# Patient Record
Sex: FEMALE | Race: WHITE | NOT HISPANIC OR LATINO | URBAN - METROPOLITAN AREA
[De-identification: names, ages, dates, MRNs, and addresses within clinical notes are randomized per-mention and may not be internally consistent; named-entity substitution may affect disease eponyms.]

---

## 2018-08-13 ENCOUNTER — APPOINTMENT (OUTPATIENT)
Dept: URBAN - METROPOLITAN AREA CLINIC 219 | Age: 43
Setting detail: DERMATOLOGY
End: 2018-08-13

## 2018-08-13 DIAGNOSIS — L81.4 OTHER MELANIN HYPERPIGMENTATION: ICD-10-CM

## 2018-08-13 DIAGNOSIS — L82.0 INFLAMED SEBORRHEIC KERATOSIS: ICD-10-CM

## 2018-08-13 PROBLEM — L29.8 OTHER PRURITUS: Status: ACTIVE | Noted: 2018-08-13

## 2018-08-13 PROBLEM — G40.909 EPILEPSY, UNSPECIFIED, NOT INTRACTABLE, WITHOUT STATUS EPILEPTICUS: Status: ACTIVE | Noted: 2018-08-13

## 2018-08-13 PROCEDURE — OTHER LIQUID NITROGEN: OTHER

## 2018-08-13 PROCEDURE — OTHER REASSURANCE: OTHER

## 2018-08-13 PROCEDURE — 99202 OFFICE O/P NEW SF 15 MIN: CPT | Mod: 25

## 2018-08-13 PROCEDURE — 17110 DESTRUCT B9 LESION 1-14: CPT

## 2018-08-13 ASSESSMENT — LOCATION ZONE DERM
LOCATION ZONE: NOSE
LOCATION ZONE: TRUNK
LOCATION ZONE: NECK
LOCATION ZONE: FACE

## 2018-08-13 ASSESSMENT — LOCATION SIMPLE DESCRIPTION DERM
LOCATION SIMPLE: RIGHT CHEEK
LOCATION SIMPLE: TRAPEZIAL NECK
LOCATION SIMPLE: LEFT CHEEK
LOCATION SIMPLE: RIGHT UPPER BACK
LOCATION SIMPLE: NOSE

## 2018-08-13 ASSESSMENT — LOCATION DETAILED DESCRIPTION DERM
LOCATION DETAILED: MID TRAPEZIAL NECK
LOCATION DETAILED: RIGHT INFERIOR UPPER BACK
LOCATION DETAILED: RIGHT CENTRAL MALAR CHEEK
LOCATION DETAILED: NASAL DORSUM
LOCATION DETAILED: LEFT INFERIOR CENTRAL MALAR CHEEK

## 2018-08-13 NOTE — PROCEDURE: LIQUID NITROGEN
Consent: The patient's consent was obtained including but not limited to risks of crusting, scabbing, blistering, scarring, darker or lighter pigmentary change, recurrence, incomplete removal and infection.
Post-Care Instructions: I reviewed with the patient in detail post-care instructions. Patient is to wear sunprotection, and avoid picking at any of the treated lesions. Pt may apply Vaseline to crusted or scabbing areas.
Medical Necessity Information: It is in your best interest to select a reason for this procedure from the list below. All of these items fulfill various CMS LCD requirements except the new and changing color options.
Include Z78.9 (Other Specified Conditions Influencing Health Status) As An Associated Diagnosis?: No
Detail Level: Detailed
Medical Necessity Clause: This procedure was medically necessary because the lesions that were treated were:

## 2020-02-24 ENCOUNTER — APPOINTMENT (OUTPATIENT)
Dept: URBAN - METROPOLITAN AREA CLINIC 219 | Age: 45
Setting detail: DERMATOLOGY
End: 2020-02-25

## 2020-02-24 DIAGNOSIS — D18.0 HEMANGIOMA: ICD-10-CM

## 2020-02-24 DIAGNOSIS — L60.1 ONYCHOLYSIS: ICD-10-CM

## 2020-02-24 DIAGNOSIS — L67.8 OTHER HAIR COLOR AND HAIR SHAFT ABNORMALITIES: ICD-10-CM

## 2020-02-24 DIAGNOSIS — D22 MELANOCYTIC NEVI: ICD-10-CM

## 2020-02-24 PROBLEM — D18.01 HEMANGIOMA OF SKIN AND SUBCUTANEOUS TISSUE: Status: ACTIVE | Noted: 2020-02-24

## 2020-02-24 PROBLEM — D22.5 MELANOCYTIC NEVI OF TRUNK: Status: ACTIVE | Noted: 2020-02-24

## 2020-02-24 PROCEDURE — OTHER REASSURANCE: OTHER

## 2020-02-24 PROCEDURE — OTHER TREATMENT REGIMEN: OTHER

## 2020-02-24 PROCEDURE — OTHER COUNSELING: OTHER

## 2020-02-24 PROCEDURE — 99213 OFFICE O/P EST LOW 20 MIN: CPT

## 2020-02-24 ASSESSMENT — LOCATION DETAILED DESCRIPTION DERM
LOCATION DETAILED: LEFT MEDIAL FRONTAL SCALP
LOCATION DETAILED: RIGHT INFERIOR MEDIAL MIDBACK
LOCATION DETAILED: LEFT LATERAL ABDOMEN

## 2020-02-24 ASSESSMENT — LOCATION ZONE DERM
LOCATION ZONE: TRUNK
LOCATION ZONE: SCALP

## 2020-02-24 ASSESSMENT — LOCATION SIMPLE DESCRIPTION DERM
LOCATION SIMPLE: LEFT SCALP
LOCATION SIMPLE: RIGHT LOWER BACK
LOCATION SIMPLE: ABDOMEN

## 2020-02-24 NOTE — HPI: OTHER
Condition:: Hair breakage on two separate areas of scalp- present for two years
Please Describe Your Condition:: comes in for a chief complaint of Hair breakage on two separate areas of scalp- present for two years. Mother states the hair in those areas not growing as quickly as in other areas. Denies any new med or med changes around the time the condition presented. Patient referred for evaluation by her PCP. Patient denies cutting or pulling hair
Condition:: Separation of fingernails from nail bed
Please Describe Your Condition:: Present for several months

## 2020-02-24 NOTE — PROCEDURE: TREATMENT REGIMEN
Plan: Recommend shampooing hair daily- alternate Nioxin and Selsun Blue moisturizing hair with aloe\\nBiotin 2.5 to 5 mg once a day- instructed to stop med 3 days prior to any bloodwork as it may alter results
Plan: Recommend Biotin 2.5 to 5mg once a day- patient to discontinue 3 days prior to any lab work as it may alter results
Detail Level: Simple

## 2020-08-27 ENCOUNTER — CLAIMS CREATED FROM THE CLAIM WINDOW (OUTPATIENT)
Dept: URBAN - NONMETROPOLITAN AREA CLINIC 2 | Facility: CLINIC | Age: 45
End: 2020-08-27
Payer: MEDICARE

## 2020-08-27 ENCOUNTER — LAB OUTSIDE AN ENCOUNTER (OUTPATIENT)
Dept: URBAN - NONMETROPOLITAN AREA CLINIC 2 | Facility: CLINIC | Age: 45
End: 2020-08-27

## 2020-08-27 DIAGNOSIS — K21.9 GERD (GASTROESOPHAGEAL REFLUX DISEASE): ICD-10-CM

## 2020-08-27 DIAGNOSIS — A04.8 BACTERIAL INFECTION DUE TO H. PYLORI: ICD-10-CM

## 2020-08-27 DIAGNOSIS — R19.7 DIARRHEA: ICD-10-CM

## 2020-08-27 DIAGNOSIS — R13.19 CERVICAL DYSPHAGIA: ICD-10-CM

## 2020-08-27 DIAGNOSIS — B96.81 H. PYLORI INFECTION: ICD-10-CM

## 2020-08-27 DIAGNOSIS — R13.10 DYSPHAGIA: ICD-10-CM

## 2020-08-27 PROCEDURE — G8420 CALC BMI NORM PARAMETERS: HCPCS | Performed by: NURSE PRACTITIONER

## 2020-08-27 PROCEDURE — 99214 OFFICE O/P EST MOD 30 MIN: CPT | Performed by: NURSE PRACTITIONER

## 2020-08-27 RX ORDER — SODIUM, POTASSIUM,MAG SULFATES 17.5-3.13G
354 ML AS DIRECTED SOLUTION, RECONSTITUTED, ORAL ORAL ONCE
Qty: 354 MILLILITER | Refills: 0 | OUTPATIENT
Start: 2020-08-27 | End: 2020-08-28

## 2020-08-27 RX ORDER — LEVONORGESTREL AND ETHINYL ESTRADIOL 0.1-0.02MG
TAKE 1 TABLET BY ORAL ROUTE ONCE DAILY KIT ORAL 1
Qty: 0 | Refills: 0 | Status: ACTIVE | COMMUNITY
Start: 1900-01-01

## 2020-08-27 NOTE — HPI-TODAY'S VISIT:
Jessica presents for follow-up of reflux, and diarrhea.  Since her last visit she had been doing well.  She has a history of H. pylori gastritis status post Prevpac.  Her reflux is stable on pantoprazole 20 mg daily.  Over the past 3 weeks she has had increased postprandial bloating with diarrhea.  Her mother is concerned that she is contracted H. pylori again.  She states that she will have multiple loose bowel movements throughout the day.  This is typically after eating but particularly after eating fatty meals.  She denies any mucus or blood in the stool.  She denies any nocturnal symptoms.  Her blood work in the past has been normal, however she has noticed increased bruising recently.  She denies any specific trigger meals, but does note eating out like [facility] may make the diarrhea worse.  She does still have her gallbladder.  She has never had a colonoscopy.  She does use the Levsin after her symptoms began with some relief, she is not taking it prophylactically.  Today the diarrhea is her main complaint.  MB

## 2020-08-28 LAB
A/G RATIO: 1.7
ALBUMIN: 3.8
ALKALINE PHOSPHATASE: 45
ALT (SGPT): 6
AST (SGOT): 14
BASO (ABSOLUTE): 0
BASOS: 0
BILIRUBIN, TOTAL: 0.3
BUN/CREATININE RATIO: 16
BUN: 12
C-REACTIVE PROTEIN, QUANT: 4
CALCIUM: 8.6
CARBON DIOXIDE, TOTAL: 22
CHLORIDE: 102
CREATININE: 0.73
EGFR IF AFRICN AM: 117
EGFR IF NONAFRICN AM: 101
EOS (ABSOLUTE): 0
EOS: 0
GLOBULIN, TOTAL: 2.2
GLUCOSE: 79
HEMATOCRIT: 36.6
HEMATOLOGY COMMENTS:: (no result)
HEMOGLOBIN: 12.1
IMMATURE CELLS: (no result)
IMMATURE GRANS (ABS): 0
IMMATURE GRANULOCYTES: 0
LYMPHS (ABSOLUTE): 2.4
LYMPHS: 35
MCH: 28.5
MCHC: 33.1
MCV: 86
MONOCYTES(ABSOLUTE): 0.3
MONOCYTES: 4
NEUTROPHILS (ABSOLUTE): 4.2
NEUTROPHILS: 61
NRBC: (no result)
PLATELETS: 119
POTASSIUM: 3.9
PROTEIN, TOTAL: 6
RBC: 4.25
RDW: 12.7
SEDIMENTATION RATE-WESTERGREN: 18
SODIUM: 140
WBC: 6.8

## 2020-09-01 ENCOUNTER — LAB OUTSIDE AN ENCOUNTER (OUTPATIENT)
Dept: URBAN - NONMETROPOLITAN AREA CLINIC 2 | Facility: CLINIC | Age: 45
End: 2020-09-01

## 2020-09-04 LAB — H. PYLORI STOOL AG, EIA: NEGATIVE

## 2020-09-08 LAB
A/G RATIO: (no result)
ALBUMIN: (no result)
ALKALINE PHOSPHATASE: (no result)
ALT (SGPT): (no result)
AST (SGOT): (no result)
BASO (ABSOLUTE): (no result)
BASOS: (no result)
BILIRUBIN, TOTAL: (no result)
BUN/CREATININE RATIO: (no result)
BUN: (no result)
C-REACTIVE PROTEIN, QUANT: (no result)
CALCIUM: (no result)
CAMPYLOBACTER CULTURE: (no result)
CARBON DIOXIDE, TOTAL: (no result)
CHLORIDE: (no result)
CREATININE: (no result)
E COLI SHIGA TOXIN EIA: NEGATIVE
EGFR IF AFRICN AM: (no result)
EGFR IF NONAFRICN AM: (no result)
EOS (ABSOLUTE): (no result)
EOS: (no result)
GLOBULIN, TOTAL: (no result)
GLUCOSE: (no result)
HEMATOCRIT: (no result)
HEMATOLOGY COMMENTS:: (no result)
HEMOGLOBIN: (no result)
IMMATURE CELLS: (no result)
IMMATURE GRANS (ABS): (no result)
IMMATURE GRANULOCYTES: (no result)
LYMPHS (ABSOLUTE): (no result)
LYMPHS: (no result)
Lab: (no result)
MCH: (no result)
MCHC: (no result)
MCV: (no result)
MONOCYTES(ABSOLUTE): (no result)
MONOCYTES: (no result)
NEUTROPHILS (ABSOLUTE): (no result)
NEUTROPHILS: (no result)
NRBC: (no result)
OVA + PARASITE EXAM: (no result)
PLATELETS: (no result)
POTASSIUM: (no result)
PROTEIN, TOTAL: (no result)
RBC: (no result)
RDW: (no result)
REQUEST PROBLEM: (no result)
SALMONELLA/SHIGELLA SCREEN: (no result)
SEDIMENTATION RATE-WESTERGREN: (no result)
SODIUM: (no result)
WBC: (no result)
WHITE BLOOD CELLS (WBC), STOOL: (no result)

## 2020-09-16 ENCOUNTER — OFFICE VISIT (OUTPATIENT)
Dept: URBAN - NONMETROPOLITAN AREA SURGERY CENTER 1 | Facility: SURGERY CENTER | Age: 45
End: 2020-09-16
Payer: MEDICARE

## 2020-09-16 ENCOUNTER — CLAIMS CREATED FROM THE CLAIM WINDOW (OUTPATIENT)
Dept: URBAN - METROPOLITAN AREA CLINIC 4 | Facility: CLINIC | Age: 45
End: 2020-09-16
Payer: MEDICARE

## 2020-09-16 DIAGNOSIS — R10.84 ABDOMINAL CRAMPING, GENERALIZED: ICD-10-CM

## 2020-09-16 DIAGNOSIS — R19.7 ACUTE DIARRHEA: ICD-10-CM

## 2020-09-16 DIAGNOSIS — K63.89 OTHER SPECIFIED DISEASES OF INTESTINE: ICD-10-CM

## 2020-09-16 PROCEDURE — 88305 TISSUE EXAM BY PATHOLOGIST: CPT | Performed by: PATHOLOGY

## 2020-09-16 PROCEDURE — G8907 PT DOC NO EVENTS ON DISCHARG: HCPCS | Performed by: INTERNAL MEDICINE

## 2020-09-16 PROCEDURE — 88342 IMHCHEM/IMCYTCHM 1ST ANTB: CPT | Performed by: PATHOLOGY

## 2020-09-16 PROCEDURE — 45380 COLONOSCOPY AND BIOPSY: CPT | Performed by: INTERNAL MEDICINE

## 2020-09-16 PROCEDURE — 88313 SPECIAL STAINS GROUP 2: CPT | Performed by: PATHOLOGY

## 2020-09-16 PROCEDURE — G9937 DIG OR SURV COLSCO: HCPCS | Performed by: INTERNAL MEDICINE

## 2020-10-19 ENCOUNTER — WEB ENCOUNTER (OUTPATIENT)
Dept: URBAN - NONMETROPOLITAN AREA CLINIC 2 | Facility: CLINIC | Age: 45
End: 2020-10-19

## 2020-10-19 ENCOUNTER — OFFICE VISIT (OUTPATIENT)
Dept: URBAN - NONMETROPOLITAN AREA CLINIC 2 | Facility: CLINIC | Age: 45
End: 2020-10-19
Payer: MEDICARE

## 2020-10-19 VITALS
BODY MASS INDEX: 21.66 KG/M2 | WEIGHT: 130 LBS | TEMPERATURE: 97 F | HEART RATE: 86 BPM | DIASTOLIC BLOOD PRESSURE: 80 MMHG | HEIGHT: 65 IN | SYSTOLIC BLOOD PRESSURE: 122 MMHG

## 2020-10-19 DIAGNOSIS — B96.81 H. PYLORI INFECTION: ICD-10-CM

## 2020-10-19 DIAGNOSIS — K21.9 GERD (GASTROESOPHAGEAL REFLUX DISEASE): ICD-10-CM

## 2020-10-19 DIAGNOSIS — R19.7 DIARRHEA: ICD-10-CM

## 2020-10-19 PROCEDURE — G8420 CALC BMI NORM PARAMETERS: HCPCS | Performed by: NURSE PRACTITIONER

## 2020-10-19 PROCEDURE — G8427 DOCREV CUR MEDS BY ELIG CLIN: HCPCS | Performed by: NURSE PRACTITIONER

## 2020-10-19 PROCEDURE — 1036F TOBACCO NON-USER: CPT | Performed by: NURSE PRACTITIONER

## 2020-10-19 PROCEDURE — 99213 OFFICE O/P EST LOW 20 MIN: CPT | Performed by: NURSE PRACTITIONER

## 2020-10-19 RX ORDER — LEVONORGESTREL AND ETHINYL ESTRADIOL 0.1-0.02MG
TAKE 1 TABLET BY ORAL ROUTE ONCE DAILY KIT ORAL 1
Qty: 0 | Refills: 0 | Status: ACTIVE | COMMUNITY
Start: 1900-01-01

## 2020-10-19 NOTE — HPI-TODAY'S VISIT:
Jessica presents for follow-up of reflux, and diarrhea.  Since her last visit she had been doing well.  She has a history of H. pylori gastritis status post Prevpac.  Her reflux is stable on pantoprazole 20 mg daily.  Over the past 3 weeks she has had increased postprandial bloating with diarrhea.  Her mother is concerned that she is contracted H. pylori again.  She states that she will have multiple loose bowel movements throughout the day.  This is typically after eating but particularly after eating fatty meals.  She denies any mucus or blood in the stool.  She denies any nocturnal symptoms.  Her blood work in the past has been normal, however she has noticed increased bruising recently.  She denies any specific trigger meals, but does note eating out like [facility] may make the diarrhea worse.  She does still have her gallbladder.  She has never had a colonoscopy.  She does use the Levsin after her symptoms began with some relief, she is not taking it prophylactically.  Today the diarrhea is her main complaint.  MB   10/19/2020 Jessica presents for colonoscopy follow-up.  She status post colonoscopy with normal random biopsies.  She is taking the dicyclomine daily and before trigger meals as needed with the relief.  She uses the Levsin if she eats something and develops cramping and needs immediate relief.  Today she is doing well, her reflux is controlled on Prilosec as needed, and she has no new GI complaints. MB

## 2020-10-30 ENCOUNTER — ERX REFILL RESPONSE (OUTPATIENT)
Age: 45
End: 2020-10-30

## 2020-10-30 RX ORDER — PANTOPRAZOLE 20 MG/1
TAKE 1 TABLET BY MOUTH ONCE DAILY TABLET, DELAYED RELEASE ORAL
Qty: 90 | Refills: 3

## 2020-10-30 RX ORDER — HYOSCYAMINE SULFATE 0.12 MG/1
DISSOLVE 1 TABLET IN MOUTH EVERY 4 TO 6  HOURS AS NEEDED FOR  SPASMS,  ABDOMINAL  PAIN  AND  DIARRHEA TABLET SUBLINGUAL
Qty: 90 | Refills: 3

## 2020-11-02 ENCOUNTER — ERX REFILL RESPONSE (OUTPATIENT)
Age: 45
End: 2020-11-02

## 2020-11-02 RX ORDER — DICYCLOMINE HYDROCHLORIDE 10 MG/1
TAKE 1 CAPSULE BY MOUTH TWICE DAILY CAPSULE ORAL
Qty: 60 | Refills: 0

## 2020-12-08 ENCOUNTER — ERX REFILL RESPONSE (OUTPATIENT)
Age: 45
End: 2020-12-08

## 2020-12-08 RX ORDER — DICYCLOMINE HYDROCHLORIDE 10 MG/1
TAKE 1 CAPSULE BY MOUTH TWICE DAILY CAPSULE ORAL
Qty: 60 | Refills: 0

## 2021-02-19 ENCOUNTER — ERX REFILL RESPONSE (OUTPATIENT)
Age: 46
End: 2021-02-19

## 2021-02-19 RX ORDER — DICYCLOMINE HYDROCHLORIDE 10 MG/1
TAKE 1 CAPSULE BY MOUTH TWICE DAILY CAPSULE ORAL
Qty: 60 | Refills: 11

## 2021-04-12 ENCOUNTER — OFFICE VISIT (OUTPATIENT)
Dept: URBAN - NONMETROPOLITAN AREA CLINIC 2 | Facility: CLINIC | Age: 46
End: 2021-04-12

## 2021-05-26 ENCOUNTER — OFFICE VISIT (OUTPATIENT)
Dept: URBAN - NONMETROPOLITAN AREA CLINIC 13 | Facility: CLINIC | Age: 46
End: 2021-05-26
Payer: MEDICARE

## 2021-05-26 ENCOUNTER — WEB ENCOUNTER (OUTPATIENT)
Dept: URBAN - NONMETROPOLITAN AREA CLINIC 13 | Facility: CLINIC | Age: 46
End: 2021-05-26

## 2021-05-26 VITALS
BODY MASS INDEX: 21.66 KG/M2 | DIASTOLIC BLOOD PRESSURE: 74 MMHG | WEIGHT: 130 LBS | HEIGHT: 65 IN | TEMPERATURE: 97.2 F | HEART RATE: 103 BPM | SYSTOLIC BLOOD PRESSURE: 120 MMHG

## 2021-05-26 DIAGNOSIS — R19.7 DIARRHEA: ICD-10-CM

## 2021-05-26 DIAGNOSIS — Z12.11 COLON CANCER SCREENING: ICD-10-CM

## 2021-05-26 DIAGNOSIS — B96.81 H. PYLORI INFECTION: ICD-10-CM

## 2021-05-26 DIAGNOSIS — K21.9 GERD (GASTROESOPHAGEAL REFLUX DISEASE): ICD-10-CM

## 2021-05-26 PROCEDURE — 99214 OFFICE O/P EST MOD 30 MIN: CPT | Performed by: INTERNAL MEDICINE

## 2021-05-26 RX ORDER — HYOSCYAMINE SULFATE 0.12 MG/1
DISSOLVE 1 TABLET IN MOUTH EVERY 4 TO 6  HOURS AS NEEDED FOR  SPASMS,  ABDOMINAL  PAIN  AND  DIARRHEA TABLET SUBLINGUAL
Qty: 90 | Refills: 3 | Status: ACTIVE | COMMUNITY

## 2021-05-26 RX ORDER — HYOSCYAMINE SULFATE 0.12 MG/1
1 TABLET AS NEEDED TABLET SUBLINGUAL
Qty: 180 TABLET | Refills: 4

## 2021-05-26 RX ORDER — DICYCLOMINE HYDROCHLORIDE 10 MG/1
TAKE 1 CAPSULE BY MOUTH TWICE DAILY CAPSULE ORAL
Qty: 60 | Refills: 11 | Status: ACTIVE | COMMUNITY

## 2021-05-26 RX ORDER — DICYCLOMINE HYDROCHLORIDE 10 MG/1
TAKE 1 CAPSULE BY ORAL ROUTE 2 TIMES A DAY  BEFORE MEALS AS NEEDED FOR DIARRHEA/URGENCY CAPSULE ORAL BID
Qty: 180 CAPSULES | Refills: 4

## 2021-05-26 RX ORDER — PANTOPRAZOLE 20 MG/1
1 TABLET TABLET, DELAYED RELEASE ORAL ONCE A DAY
Qty: 90 TABLET | Refills: 3

## 2021-05-26 RX ORDER — PANTOPRAZOLE 20 MG/1
TAKE 1 TABLET BY MOUTH ONCE DAILY TABLET, DELAYED RELEASE ORAL
Qty: 90 | Refills: 3 | Status: ACTIVE | COMMUNITY

## 2021-05-26 RX ORDER — LEVONORGESTREL AND ETHINYL ESTRADIOL 0.1-0.02MG
TAKE 1 TABLET BY ORAL ROUTE ONCE DAILY KIT ORAL 1
Qty: 0 | Refills: 0 | Status: ACTIVE | COMMUNITY
Start: 1900-01-01

## 2021-05-26 NOTE — HPI-TODAY'S VISIT:
Jessica presents for follow-up of reflux, and diarrhea.  Since her last visit she had been doing well.  She has a history of H. pylori gastritis status post Prevpac.  Her reflux is stable on pantoprazole 20 mg daily.  Over the past 3 weeks she has had increased postprandial bloating with diarrhea.  Her mother is concerned that she is contracted H. pylori again.  She states that she will have multiple loose bowel movements throughout the day.  This is typically after eating but particularly after eating fatty meals.  She denies any mucus or blood in the stool.  She denies any nocturnal symptoms.  Her blood work in the past has been normal, however she has noticed increased bruising recently.  She denies any specific trigger meals, but does note eating out like [facility] may make the diarrhea worse.  She does still have her gallbladder.  She has never had a colonoscopy.  She does use the Levsin after her symptoms began with some relief, she is not taking it prophylactically.  Today the diarrhea is her main complaint.  MB   10/19/2020 Jessica presents for colonoscopy follow-up.  She status post colonoscopy with normal random biopsies.  She is taking the dicyclomine daily and before trigger meals as needed with the relief.  She uses the Levsin if she eats something and develops cramping and needs immediate relief.  Today she is doing well, her reflux is controlled on Prilosec as needed, and she has no new GI complaints. MB   5/26/2021 Jessica presents for follow-up of IBS and reflux.  She is doing great on Protonix daily and dicyclomine 1-2 times daily as needed.  Her diarrhea is stable.  She denies any flares of reflux.  She does not want to change her medication at this time.  Today she has no new GI complaints.  MB

## 2022-07-05 ENCOUNTER — TELEPHONE ENCOUNTER (OUTPATIENT)
Dept: URBAN - METROPOLITAN AREA CLINIC 92 | Facility: CLINIC | Age: 47
End: 2022-07-05

## 2022-07-05 RX ORDER — PANTOPRAZOLE 20 MG/1
1 TABLET TABLET, DELAYED RELEASE ORAL ONCE A DAY
Qty: 90 TABLET | Refills: 3

## 2022-08-31 ENCOUNTER — TELEPHONE ENCOUNTER (OUTPATIENT)
Dept: URBAN - NONMETROPOLITAN AREA CLINIC 2 | Facility: CLINIC | Age: 47
End: 2022-08-31

## 2022-08-31 RX ORDER — DICYCLOMINE HYDROCHLORIDE 10 MG/1
TAKE 1 CAPSULE BY ORAL ROUTE 2 TIMES A DAY  BEFORE MEALS AS NEEDED FOR DIARRHEA/URGENCY CAPSULE ORAL BID
Qty: 180 CAPSULES | Refills: 4
End: 2023-11-24

## 2022-08-31 RX ORDER — HYOSCYAMINE SULFATE 0.12 MG/1
1 TABLET AS NEEDED TABLET SUBLINGUAL
Qty: 180 TABLET | Refills: 4
End: 2023-11-24

## 2022-09-21 ENCOUNTER — OFFICE VISIT (OUTPATIENT)
Dept: URBAN - METROPOLITAN AREA TELEHEALTH 2 | Facility: TELEHEALTH | Age: 47
End: 2022-09-21
Payer: MEDICARE

## 2022-09-21 DIAGNOSIS — K21.9 GERD (GASTROESOPHAGEAL REFLUX DISEASE): ICD-10-CM

## 2022-09-21 DIAGNOSIS — Z12.11 COLON CANCER SCREENING: ICD-10-CM

## 2022-09-21 DIAGNOSIS — B96.81 H. PYLORI INFECTION: ICD-10-CM

## 2022-09-21 DIAGNOSIS — R19.7 DIARRHEA: ICD-10-CM

## 2022-09-21 PROBLEM — 305058001: Status: ACTIVE | Noted: 2021-05-26

## 2022-09-21 PROCEDURE — 99213 OFFICE O/P EST LOW 20 MIN: CPT | Performed by: NURSE PRACTITIONER

## 2022-09-21 RX ORDER — DICYCLOMINE HYDROCHLORIDE 10 MG/1
TAKE 1 CAPSULE BY ORAL ROUTE 2 TIMES A DAY  BEFORE MEALS AS NEEDED FOR DIARRHEA/URGENCY CAPSULE ORAL BID
Qty: 180 CAPSULES | Refills: 4 | Status: ACTIVE | COMMUNITY
End: 2023-11-24

## 2022-09-21 RX ORDER — LEVONORGESTREL AND ETHINYL ESTRADIOL 0.1-0.02MG
TAKE 1 TABLET BY ORAL ROUTE ONCE DAILY KIT ORAL 1
Qty: 0 | Refills: 0 | Status: ACTIVE | COMMUNITY
Start: 1900-01-01

## 2022-09-21 RX ORDER — PANTOPRAZOLE 20 MG/1
1 TABLET TABLET, DELAYED RELEASE ORAL ONCE A DAY
Qty: 90 TABLET | Refills: 3 | Status: ACTIVE | COMMUNITY

## 2022-09-21 RX ORDER — HYOSCYAMINE SULFATE 0.12 MG/1
1 TABLET AS NEEDED TABLET SUBLINGUAL
Qty: 180 TABLET | Refills: 4 | Status: ACTIVE | COMMUNITY
End: 2023-11-24

## 2022-09-21 NOTE — HPI-TODAY'S VISIT:
Jessica presents for follow-up of reflux, and diarrhea.  Since her last visit she had been doing well.  She has a history of H. pylori gastritis status post Prevpac.  Her reflux is stable on pantoprazole 20 mg daily.  Over the past 3 weeks she has had increased postprandial bloating with diarrhea.  Her mother is concerned that she is contracted H. pylori again.  She states that she will have multiple loose bowel movements throughout the day.  This is typically after eating but particularly after eating fatty meals.  She denies any mucus or blood in the stool.  She denies any nocturnal symptoms.  Her blood work in the past has been normal, however she has noticed increased bruising recently.  She denies any specific trigger meals, but does note eating out like [facility] may make the diarrhea worse.  She does still have her gallbladder.  She has never had a colonoscopy.  She does use the Levsin after her symptoms began with some relief, she is not taking it prophylactically.  Today the diarrhea is her main complaint.  MB   10/19/2020 Jessica presents for colonoscopy follow-up.  She status post colonoscopy with normal random biopsies.  She is taking the dicyclomine daily and before trigger meals as needed with the relief.  She uses the Levsin if she eats something and develops cramping and needs immediate relief.  Today she is doing well, her reflux is controlled on Prilosec as needed, and she has no new GI complaints. MB   5/26/2021 Jessica presents for follow-up of IBS and reflux.  She is doing great on Protonix daily and dicyclomine 1-2 times daily as needed.  Her diarrhea is stable.  She denies any flares of reflux.  She does not want to change her medication at this time.  Today she has no new GI complaints.  MB  9/21/2022 Jessica presents for follow up of reflux and IBS-D. She is doing well on pantoprazole 20mg daily and dicyclomine 10mg BID. She uses levsin PRN if she has breakthrough cramping and needs immediate relief. Today she is doing well with no new GI complaints and here for a refill. MB  This visit was provided at the patient's home.

## 2023-09-21 ENCOUNTER — OFFICE VISIT (OUTPATIENT)
Dept: URBAN - NONMETROPOLITAN AREA CLINIC 2 | Facility: CLINIC | Age: 48
End: 2023-09-21
Payer: MEDICARE

## 2023-09-21 ENCOUNTER — DASHBOARD ENCOUNTERS (OUTPATIENT)
Age: 48
End: 2023-09-21

## 2023-09-21 VITALS
BODY MASS INDEX: 24.53 KG/M2 | HEIGHT: 65 IN | DIASTOLIC BLOOD PRESSURE: 72 MMHG | SYSTOLIC BLOOD PRESSURE: 133 MMHG | WEIGHT: 147.2 LBS | HEART RATE: 89 BPM | TEMPERATURE: 98.1 F

## 2023-09-21 DIAGNOSIS — R19.7 DIARRHEA: ICD-10-CM

## 2023-09-21 DIAGNOSIS — Z12.11 COLON CANCER SCREENING: ICD-10-CM

## 2023-09-21 DIAGNOSIS — K21.9 GERD (GASTROESOPHAGEAL REFLUX DISEASE): ICD-10-CM

## 2023-09-21 DIAGNOSIS — B96.81 H. PYLORI INFECTION: ICD-10-CM

## 2023-09-21 PROCEDURE — 99214 OFFICE O/P EST MOD 30 MIN: CPT | Performed by: NURSE PRACTITIONER

## 2023-09-21 RX ORDER — LEVONORGESTREL AND ETHINYL ESTRADIOL 0.1-0.02MG
TAKE 1 TABLET BY ORAL ROUTE ONCE DAILY KIT ORAL 1
Qty: 0 | Refills: 0 | Status: ACTIVE | COMMUNITY
Start: 1900-01-01

## 2023-09-21 RX ORDER — HYOSCYAMINE SULFATE 0.12 MG/1
1 TABLET AS NEEDED TABLET SUBLINGUAL
Qty: 180 TABLET | Refills: 4 | Status: ACTIVE | COMMUNITY
End: 2023-11-24

## 2023-09-21 RX ORDER — DICYCLOMINE HYDROCHLORIDE 10 MG/1
1 CAPSULE CAPSULE ORAL TWICE DAILY
Qty: 180 CAPSULES | Refills: 4

## 2023-09-21 RX ORDER — PANTOPRAZOLE 20 MG/1
1 TABLET TABLET, DELAYED RELEASE ORAL ONCE A DAY
Qty: 90 TABLET | Refills: 3

## 2023-09-21 RX ORDER — PANTOPRAZOLE 20 MG/1
1 TABLET TABLET, DELAYED RELEASE ORAL ONCE A DAY
Qty: 90 TABLET | Refills: 3 | Status: ACTIVE | COMMUNITY

## 2023-09-21 RX ORDER — DICYCLOMINE HYDROCHLORIDE 10 MG/1
TAKE 1 CAPSULE BY ORAL ROUTE 2 TIMES A DAY  BEFORE MEALS AS NEEDED FOR DIARRHEA/URGENCY CAPSULE ORAL BID
Qty: 180 CAPSULES | Refills: 4 | Status: ACTIVE | COMMUNITY
End: 2023-11-24

## 2023-09-21 RX ORDER — HYOSCYAMINE SULFATE 0.12 MG/1
1 TABLET AS NEEDED TABLET SUBLINGUAL
Qty: 180 TABLET | Refills: 4
End: 2024-12-14

## 2023-09-21 NOTE — HPI-TODAY'S VISIT:
Jessica presents for follow-up of reflux, and diarrhea.  Since her last visit she had been doing well.  She has a history of H. pylori gastritis status post Prevpac.  Her reflux is stable on pantoprazole 20 mg daily.  Over the past 3 weeks she has had increased postprandial bloating with diarrhea.  Her mother is concerned that she is contracted H. pylori again.  She states that she will have multiple loose bowel movements throughout the day.  This is typically after eating but particularly after eating fatty meals.  She denies any mucus or blood in the stool.  She denies any nocturnal symptoms.  Her blood work in the past has been normal, however she has noticed increased bruising recently.  She denies any specific trigger meals, but does note eating out like [facility] may make the diarrhea worse.  She does still have her gallbladder.  She has never had a colonoscopy.  She does use the Levsin after her symptoms began with some relief, she is not taking it prophylactically.  Today the diarrhea is her main complaint.  MB   10/19/2020 Jessica presents for colonoscopy follow-up.  She status post colonoscopy with normal random biopsies.  She is taking the dicyclomine daily and before trigger meals as needed with the relief.  She uses the Levsin if she eats something and develops cramping and needs immediate relief.  Today she is doing well, her reflux is controlled on Prilosec as needed, and she has no new GI complaints. MB   5/26/2021 Jessica presents for follow-up of IBS and reflux.  She is doing great on Protonix daily and dicyclomine 1-2 times daily as needed.  Her diarrhea is stable.  She denies any flares of reflux.  She does not want to change her medication at this time.  Today she has no new GI complaints.  MB  9/21/2022 Jessica presents for follow up of reflux and IBS-D. She is doing well on pantoprazole 20mg daily and dicyclomine 10mg BID. She uses levsin PRN if she has breakthrough cramping and needs immediate relief. Today she is doing well with no new GI complaints and here for a refill. MB  This visit was provided at the patient's home. 9/21/2023 Jessica presents for follow-up of reflux and IBS D.  She is doing great on dicyclomine twice daily and pantoprazole 20 mg in the morning.  She does not want to wean.  She takes Levsin as needed.  Today she is here for repeat fill.  MB

## 2024-09-16 ENCOUNTER — APPOINTMENT (OUTPATIENT)
Dept: URBAN - NONMETROPOLITAN AREA CLINIC 45 | Age: 49
Setting detail: DERMATOLOGY
End: 2024-09-21

## 2024-09-16 DIAGNOSIS — L82.1 OTHER SEBORRHEIC KERATOSIS: ICD-10-CM

## 2024-09-16 DIAGNOSIS — D22 MELANOCYTIC NEVI: ICD-10-CM

## 2024-09-16 DIAGNOSIS — L72.0 EPIDERMAL CYST: ICD-10-CM

## 2024-09-16 PROBLEM — D22.5 MELANOCYTIC NEVI OF TRUNK: Status: ACTIVE | Noted: 2024-09-16

## 2024-09-16 PROCEDURE — 99203 OFFICE O/P NEW LOW 30 MIN: CPT

## 2024-09-16 PROCEDURE — OTHER OTC TREATMENT REGIMEN: OTHER

## 2024-09-16 PROCEDURE — OTHER COUNSELING: OTHER

## 2024-09-16 PROCEDURE — OTHER MIPS QUALITY: OTHER

## 2024-09-16 ASSESSMENT — LOCATION SIMPLE DESCRIPTION DERM
LOCATION SIMPLE: RIGHT BACK
LOCATION SIMPLE: LEFT CHEEK
LOCATION SIMPLE: RIGHT CHEEK
LOCATION SIMPLE: CHEST

## 2024-09-16 ASSESSMENT — LOCATION ZONE DERM
LOCATION ZONE: FACE
LOCATION ZONE: TRUNK

## 2024-09-16 ASSESSMENT — LOCATION DETAILED DESCRIPTION DERM
LOCATION DETAILED: LEFT MEDIAL MALAR CHEEK
LOCATION DETAILED: RIGHT SUPERIOR LATERAL UPPER BACK
LOCATION DETAILED: RIGHT MEDIAL MALAR CHEEK
LOCATION DETAILED: LEFT MEDIAL SUPERIOR CHEST

## 2024-09-16 NOTE — PROCEDURE: OTC TREATMENT REGIMEN
Patient Specific Otc Recommendations (Will Not Stick From Patient To Patient): Can use otc Retinol at night as tolerated \\nSunscreen daily
Detail Level: Zone